# Patient Record
Sex: FEMALE | URBAN - METROPOLITAN AREA
[De-identification: names, ages, dates, MRNs, and addresses within clinical notes are randomized per-mention and may not be internally consistent; named-entity substitution may affect disease eponyms.]

---

## 2024-04-30 ENCOUNTER — NURSE TRIAGE (OUTPATIENT)
Dept: ADMINISTRATIVE | Facility: CLINIC | Age: 54
End: 2024-04-30

## 2024-04-30 NOTE — TELEPHONE ENCOUNTER
Speaking with daughter, Alexis Severson. States he mother has bullous pemthigoid, went to [place]. Treated, came back. She would like to establish care with [facility]. States she was treated by dermatology in [place]. Advised I was unsure who would address that an how set up. Advised I would send message to dermatology but could transfer her to patient  and they could answer her questions re insurance and previous records etc. Verb understanding.     [phone].   Reason for Disposition   [1] Other NON-URGENT information for PCP AND [2] does not require PCP response    Protocols used: PCP Call - No Triage-A-

## 2024-06-18 ENCOUNTER — TELEPHONE (OUTPATIENT)
Dept: DERMATOLOGY | Facility: CLINIC | Age: 54
End: 2024-06-18